# Patient Record
Sex: FEMALE | Race: WHITE | URBAN - METROPOLITAN AREA
[De-identification: names, ages, dates, MRNs, and addresses within clinical notes are randomized per-mention and may not be internally consistent; named-entity substitution may affect disease eponyms.]

---

## 2021-05-18 ENCOUNTER — OFFICE VISIT (OUTPATIENT)
Dept: URBAN - METROPOLITAN AREA CLINIC 44 | Facility: CLINIC | Age: 71
End: 2021-05-18
Payer: MEDICARE

## 2021-05-18 DIAGNOSIS — H25.813 COMBINED FORMS OF AGE-RELATED CATARACT, BILATERAL: ICD-10-CM

## 2021-05-18 DIAGNOSIS — H43.313 VITREOUS MEMBRANES AND STRANDS, BILATERAL: ICD-10-CM

## 2021-05-18 DIAGNOSIS — H40.1134 PRIMARY OPEN-ANGLE GLAUCOMA, BILATERAL, INDETERMINATE STAGE: ICD-10-CM

## 2021-05-18 DIAGNOSIS — H33.313 HORSESHOE TEAR OF RETINA WITHOUT DETACHMENT, BILATERAL: ICD-10-CM

## 2021-05-18 DIAGNOSIS — H18.523 EPITHELIAL (JUVENILE) CORNEAL DYSTROPHY, BILATERAL: Primary | ICD-10-CM

## 2021-05-18 PROCEDURE — 76514 ECHO EXAM OF EYE THICKNESS: CPT | Performed by: OPTOMETRIST

## 2021-05-18 PROCEDURE — 92134 CPTRZ OPH DX IMG PST SGM RTA: CPT | Performed by: OPTOMETRIST

## 2021-05-18 PROCEDURE — 92083 EXTENDED VISUAL FIELD XM: CPT | Performed by: OPTOMETRIST

## 2021-05-18 PROCEDURE — 99204 OFFICE O/P NEW MOD 45 MIN: CPT | Performed by: OPTOMETRIST

## 2021-05-18 PROCEDURE — 92133 CPTRZD OPH DX IMG PST SGM ON: CPT | Performed by: OPTOMETRIST

## 2021-05-18 ASSESSMENT — VISUAL ACUITY
OD: 20/30
OS: 20/25

## 2021-05-18 ASSESSMENT — INTRAOCULAR PRESSURE
OD: 10
OS: 10

## 2021-05-18 ASSESSMENT — KERATOMETRY
OS: 38.88
OD: 38.00

## 2021-05-18 NOTE — IMPRESSION/PLAN
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 Plan: History of retinal tears OU. Attached s/p laser OU. RTC ASAP if notice symptoms of RD. Monitor.

## 2021-05-18 NOTE — IMPRESSION/PLAN
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 Plan: Mild EBDM OU. AT's for comfort. Monitor.

## 2021-05-18 NOTE — IMPRESSION/PLAN
Impression: Peripheral opacity of cornea, bilateral: J15.576. Plan: Hx of LASIK OU in 1999. May limit VA after cat sx.

## 2021-05-18 NOTE — IMPRESSION/PLAN
Impression: Primary open-angle glaucoma, bilateral, indeterminate stage: H40.1134. Plan: Disc asymmetry OS>OD Pachymetry: thin OU
IOP: 10/10 Current meds: latanoprost QHS OU
OCT RNFL (05/18/21): wnl OU
HVF (05/18/21): essentially full OU Optos taken 05/18/21 IOP okay. Continue current meds. Discussed MIGS. Follow up with Dr. Rich Corbett for IOP management after cat sx.

## 2021-05-18 NOTE — IMPRESSION/PLAN
Impression: Combined forms of age-related cataract, bilateral: H25.813. Plan: Discussed cataracts with patient. Discussed treatment options. Surgical treatment is recommended. Surgical risks and benefits discussed. Patient elects surgical treatment. Recommend surgery OU, OD first. Patient is candidate/interested in: Standard lens, LenSx,  ORA. Aim OD: plano. Aim OS: plano. May need glasses for best vision after surgery. Consider MIGS. Glaucoma surgeon recommended. Dr. Luis Felipe Marquez is referring OD.

## 2021-06-10 ENCOUNTER — PRE-OPERATIVE VISIT (OUTPATIENT)
Dept: URBAN - METROPOLITAN AREA CLINIC 44 | Facility: CLINIC | Age: 71
End: 2021-06-10
Payer: MEDICARE

## 2021-06-10 DIAGNOSIS — H25.811 COMBINED FORMS OF AGE-RELATED CATARACT, RIGHT EYE: ICD-10-CM

## 2021-06-10 DIAGNOSIS — H25.812 COMBINED FORMS OF AGE-RELATED CATARACT, LEFT EYE: ICD-10-CM

## 2021-06-10 PROCEDURE — 92025 CPTRIZED CORNEAL TOPOGRAPHY: CPT | Performed by: OPHTHALMOLOGY

## 2021-06-10 PROCEDURE — 99204 OFFICE O/P NEW MOD 45 MIN: CPT | Performed by: OPHTHALMOLOGY

## 2021-06-10 PROCEDURE — 92133 CPTRZD OPH DX IMG PST SGM ON: CPT | Performed by: OPHTHALMOLOGY

## 2021-06-10 PROCEDURE — 92020 GONIOSCOPY: CPT | Performed by: OPHTHALMOLOGY

## 2021-06-10 PROCEDURE — 92083 EXTENDED VISUAL FIELD XM: CPT | Performed by: OPHTHALMOLOGY

## 2021-06-10 PROCEDURE — 76514 ECHO EXAM OF EYE THICKNESS: CPT | Performed by: OPHTHALMOLOGY

## 2021-06-10 RX ORDER — OFLOXACIN 3 MG/ML
0.3 % SOLUTION/ DROPS OPHTHALMIC
Qty: 5 | Refills: 1 | Status: INACTIVE
Start: 2021-06-10 | End: 2022-01-28

## 2021-06-10 RX ORDER — PREDNISOLONE ACETATE 10 MG/ML
1 % SUSPENSION/ DROPS OPHTHALMIC
Qty: 10 | Refills: 1 | Status: INACTIVE
Start: 2021-06-10 | End: 2022-01-28

## 2021-06-10 RX ORDER — KETOROLAC TROMETHAMINE 5 MG/ML
0.5 % SOLUTION OPHTHALMIC
Qty: 10 | Refills: 1 | Status: INACTIVE
Start: 2021-06-10 | End: 2022-01-28

## 2021-06-10 ASSESSMENT — INTRAOCULAR PRESSURE
OD: 17
OS: 16
OS: 16
OD: 17

## 2021-06-10 ASSESSMENT — PACHYMETRY
OD: 26.35
OS: 26.70

## 2021-06-10 ASSESSMENT — VISUAL ACUITY
OS: 20/25
OD: 20/30

## 2021-06-10 NOTE — IMPRESSION/PLAN
Impression: Combined forms of age-related cataract, left eye: H25.812. Plan: Okay to proceed with the second eye once the first eye is cleared.

## 2021-06-10 NOTE — IMPRESSION/PLAN
Impression: Open angle with borderline findings, low risk, bilateral: H40.013. Plan: Will need to obtain records from Dr. Rich Corbett to determine if patient has glaucoma/OHTN and if she would be benefit from [facility]. Stop Latanoprost and Return on Monday 6/14/21 for IOP Check.

## 2021-06-10 NOTE — IMPRESSION/PLAN
Impression: Regular astigmatism, left eye: H52.222. Plan: Discussed that LRI will be performed per patient request in attempts to lessen the amount astigmatism. Discussed residual astigmatism will be left and glasses will still be needed for BCVA.

## 2021-06-10 NOTE — IMPRESSION/PLAN
Impression: Other specified postprocedural states: Z98.890. Plan: *Previous LASIK Surgery. * Clear LASIK Flap OU. May limit outcome of surgery. Patient understands that there is a 50/50 chance of the need of additional surgery.

## 2021-06-11 ENCOUNTER — ADULT PHYSICAL (OUTPATIENT)
Dept: URBAN - METROPOLITAN AREA CLINIC 56 | Facility: CLINIC | Age: 71
End: 2021-06-11
Payer: MEDICARE

## 2021-06-11 DIAGNOSIS — Z01.818 ENCOUNTER FOR OTHER PREPROCEDURAL EXAMINATION: Primary | ICD-10-CM

## 2021-06-11 PROCEDURE — 99203 OFFICE O/P NEW LOW 30 MIN: CPT | Performed by: PHYSICIAN ASSISTANT

## 2021-06-14 ENCOUNTER — OFFICE VISIT (OUTPATIENT)
Dept: URBAN - METROPOLITAN AREA CLINIC 44 | Facility: CLINIC | Age: 71
End: 2021-06-14
Payer: MEDICARE

## 2021-06-14 DIAGNOSIS — H40.013 OPEN ANGLE WITH BORDERLINE FINDINGS, LOW RISK, BILATERAL: Primary | ICD-10-CM

## 2021-06-14 PROCEDURE — 99214 OFFICE O/P EST MOD 30 MIN: CPT | Performed by: OPHTHALMOLOGY

## 2021-06-14 ASSESSMENT — INTRAOCULAR PRESSURE
OS: 15
OD: 19

## 2021-06-14 NOTE — IMPRESSION/PLAN
Impression: Open angle with borderline findings, low risk, bilateral: H40.013. Plan: IOP is doing well since stopping Latanoprost. Explained to patient that she is a glaucoma suspect, but there is no glaucoma present at this time. No treatment being implemented at this time.

## 2021-06-17 ENCOUNTER — SURGERY (OUTPATIENT)
Dept: URBAN - METROPOLITAN AREA SURGERY 19 | Facility: SURGERY | Age: 71
End: 2021-06-17
Payer: MEDICARE

## 2021-06-17 PROCEDURE — 66984 XCAPSL CTRC RMVL W/O ECP: CPT | Performed by: OPHTHALMOLOGY

## 2021-06-18 ENCOUNTER — POST-OPERATIVE VISIT (OUTPATIENT)
Dept: URBAN - METROPOLITAN AREA CLINIC 51 | Facility: CLINIC | Age: 71
End: 2021-06-18
Payer: MEDICARE

## 2021-06-18 DIAGNOSIS — Z48.810 ENCOUNTER FOR SURGICAL AFTERCARE FOLLOWING SURGERY ON A SENSE ORGAN: Primary | ICD-10-CM

## 2021-06-18 PROCEDURE — 99024 POSTOP FOLLOW-UP VISIT: CPT | Performed by: OPHTHALMOLOGY

## 2021-06-18 ASSESSMENT — INTRAOCULAR PRESSURE
OS: 16
OD: 22

## 2021-06-18 NOTE — IMPRESSION/PLAN
Impression: S/P CE/Standard IOL OD - 1 Day. Encounter for surgical aftercare following surgery on a sense organ  Z48.810. Plan: POD1 Cataract Extraction by phacoemulsification with IOL placement  OD 6/17/21. 
-Doing well.
-Start Ofloxacin QID OD, Prednisolone QID OD and Ketorolac QID OD.
-Follow drop taper schedule. Discussed activity restrictions including no bending head below waist, rubbing the eye, straining or lifting over 10lbs. for 7 days, stay out of vic, dirty areas for 7 days and shield surgical eye for the next 7 nights. Patient advised to call with any RSVP (redness, sensitivity to light, vision change, pain worse than today) call 24/7.
-RTC: One week post op check as scheduled.

## 2021-06-25 ENCOUNTER — POST-OPERATIVE VISIT (OUTPATIENT)
Dept: URBAN - METROPOLITAN AREA CLINIC 51 | Facility: CLINIC | Age: 71
End: 2021-06-25
Payer: MEDICARE

## 2021-06-25 DIAGNOSIS — H52.222 REGULAR ASTIGMATISM, LEFT EYE: ICD-10-CM

## 2021-06-25 DIAGNOSIS — H04.123 DRY EYE SYNDROME OF BILATERAL LACRIMAL GLANDS: ICD-10-CM

## 2021-06-25 DIAGNOSIS — Z98.890 OTHER SPECIFIED POSTPROCEDURAL STATES: ICD-10-CM

## 2021-06-25 DIAGNOSIS — H17.823 PERIPHERAL OPACITY OF CORNEA, BILATERAL: ICD-10-CM

## 2021-06-25 PROCEDURE — 99024 POSTOP FOLLOW-UP VISIT: CPT | Performed by: OPHTHALMOLOGY

## 2021-06-25 ASSESSMENT — VISUAL ACUITY
OS: 20/25
OD: 20/30

## 2021-06-25 ASSESSMENT — INTRAOCULAR PRESSURE
OD: 21
OS: 18

## 2021-06-25 NOTE — IMPRESSION/PLAN
Impression: Peripheral opacity of cornea, bilateral: D80.822. Plan: Patient understands condition may limit possible outcome of surgery.

## 2021-06-25 NOTE — IMPRESSION/PLAN
Impression: S/P Cataract Extraction by phacoemulsification with IOL placement OD - 8 Days. Encounter for surgical aftercare following surgery on a sense organ  Z48.810. Plan: POW1 Phaco/IOL OD. Discussed diagnosis and treatment options in detail. Patient may proceed with second eye cataract surgery based on complaint today. Surgical risks and complications were reviewed today and all questions were answered. Patient reminded of need for glasses for best vision after cataract surgery. Recommend patient continue not rubbing surgical eye for 1 month, no swimming or eye makeup for a total of two weeks. Patient wishes to proceed with surgery of the second eye.
-Drop schedule: Stop Ofloxacin. Continue Ketorolac QID OD [id number] from surgery. Begin taper of Prednisolone OD to TID x1week then BID x1week then QD x1week then stop. 
Cont Latan QHS OU

## 2021-06-25 NOTE — IMPRESSION/PLAN
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 Plan: Patient understands condition may limit possible outcome of surgery.

## 2021-06-25 NOTE — IMPRESSION/PLAN
Impression: Dry eye syndrome of bilateral lacrimal glands: H04.123. Plan: Patient understands condition may limit possible outcome of surgery.

## 2021-06-25 NOTE — IMPRESSION/PLAN
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 Plan: Patient understands condition may limit possible outcome of surgery.

## 2021-07-01 ENCOUNTER — SURGERY (OUTPATIENT)
Dept: URBAN - METROPOLITAN AREA SURGERY 19 | Facility: SURGERY | Age: 71
End: 2021-07-01
Payer: MEDICARE

## 2021-07-01 PROCEDURE — 66984 XCAPSL CTRC RMVL W/O ECP: CPT | Performed by: OPHTHALMOLOGY

## 2021-07-02 ENCOUNTER — POST-OPERATIVE VISIT (OUTPATIENT)
Dept: URBAN - METROPOLITAN AREA CLINIC 56 | Facility: CLINIC | Age: 71
End: 2021-07-02

## 2021-07-02 PROCEDURE — 99024 POSTOP FOLLOW-UP VISIT: CPT | Performed by: OPHTHALMOLOGY

## 2021-07-02 ASSESSMENT — INTRAOCULAR PRESSURE
OS: 25
OD: 20

## 2021-07-02 NOTE — IMPRESSION/PLAN
Impression: S/P Cataract Extraction by phacoemulsification with IOL placement; Astigmatic Keratotomy OS - 1 Day. Presence of intraocular lens  Z96.1. Plan: -Start Ofloxacin QID OS, Prednisolone QID OS and Ketorolac QID OS, use artificial tears as needed to reduce irritation OS. -Follow drop taper schedule. Discussed activity restrictions including no bending head below waist, rubbing the eye, straining or lifting over 10lbs. for 7 days, stay out of vic, dirty areas for 7 days and shield surgical eye for the next 7 nights. Patient advised to call with any RSVP (redness, sensitivity to light, vision change, pain worse than today) call 24/7.
-RTC: One week post op check as scheduled.

## 2021-07-08 ENCOUNTER — POST-OPERATIVE VISIT (OUTPATIENT)
Dept: URBAN - METROPOLITAN AREA CLINIC 44 | Facility: CLINIC | Age: 71
End: 2021-07-08

## 2021-07-08 DIAGNOSIS — Z96.1 PRESENCE OF INTRAOCULAR LENS: Primary | ICD-10-CM

## 2021-07-08 PROCEDURE — 99024 POSTOP FOLLOW-UP VISIT: CPT | Performed by: OPHTHALMOLOGY

## 2021-07-08 ASSESSMENT — INTRAOCULAR PRESSURE
OS: 15
OD: 21

## 2021-07-08 NOTE — IMPRESSION/PLAN
Impression: S/P Cataract Extraction by phacoemulsification with IOL placement; Astigmatic Keratotomy OS - 7 Days. Presence of intraocular lens  Z96.1. Plan: Advised patient that eye is healing well. Discontinue activity restrictions and shield use. Patient to continue not rubbing surgical eye for 1 month. Drop instructions: Stop Ofloxacin. Continue Ketorolac QID OOS [id number] from surgery. Begin taper of Prednisolone OS to TID x1week then BID x1week then QD x1week then stop. RTC 3 weeks for  PO Refraction and dilate OU with Optom.  Continue to FU Optom as a glaucoma suspect

## 2021-07-28 ENCOUNTER — POST-OPERATIVE VISIT (OUTPATIENT)
Dept: URBAN - METROPOLITAN AREA CLINIC 56 | Facility: CLINIC | Age: 71
End: 2021-07-28
Payer: MEDICARE

## 2021-07-28 PROCEDURE — 99024 POSTOP FOLLOW-UP VISIT: CPT | Performed by: OPTOMETRIST

## 2021-07-28 ASSESSMENT — VISUAL ACUITY
OS: 20/20
OD: 20/20

## 2021-07-28 ASSESSMENT — KERATOMETRY
OS: 38.55
OD: 37.98

## 2021-07-28 ASSESSMENT — INTRAOCULAR PRESSURE
OD: 13
OS: 13

## 2022-01-28 ENCOUNTER — OFFICE VISIT (OUTPATIENT)
Dept: URBAN - METROPOLITAN AREA CLINIC 56 | Facility: CLINIC | Age: 72
End: 2022-01-28
Payer: MEDICARE

## 2022-01-28 PROCEDURE — 99213 OFFICE O/P EST LOW 20 MIN: CPT | Performed by: OPTOMETRIST

## 2022-01-28 PROCEDURE — 92083 EXTENDED VISUAL FIELD XM: CPT | Performed by: OPTOMETRIST

## 2022-01-28 ASSESSMENT — INTRAOCULAR PRESSURE
OD: 12
OS: 14

## 2022-01-28 NOTE — IMPRESSION/PLAN
Impression: Open angle with borderline findings, low risk, bilateral: H40.013. Plan: IOP and testing are stable. Will not start treatment. 
RTC 6 months CE, possible OCT

## 2022-08-01 ENCOUNTER — OFFICE VISIT (OUTPATIENT)
Dept: URBAN - METROPOLITAN AREA CLINIC 56 | Facility: CLINIC | Age: 72
End: 2022-08-01
Payer: MEDICARE

## 2022-08-01 DIAGNOSIS — H40.013 OPEN ANGLE WITH BORDERLINE FINDINGS, LOW RISK, BILATERAL: Primary | ICD-10-CM

## 2022-08-01 DIAGNOSIS — H04.123 DRY EYE SYNDROME OF BILATERAL LACRIMAL GLANDS: ICD-10-CM

## 2022-08-01 DIAGNOSIS — H33.313 HORSESHOE TEAR OF RETINA WITHOUT DETACHMENT, BILATERAL: ICD-10-CM

## 2022-08-01 DIAGNOSIS — Z96.1 PRESENCE OF INTRAOCULAR LENS: ICD-10-CM

## 2022-08-01 DIAGNOSIS — H43.813 VITREOUS DEGENERATION, BILATERAL: ICD-10-CM

## 2022-08-01 DIAGNOSIS — H26.493 OTHER SECONDARY CATARACT, BILATERAL: ICD-10-CM

## 2022-08-01 PROCEDURE — 92133 CPTRZD OPH DX IMG PST SGM ON: CPT | Performed by: OPTOMETRIST

## 2022-08-01 PROCEDURE — 92014 COMPRE OPH EXAM EST PT 1/>: CPT | Performed by: OPTOMETRIST

## 2022-08-01 ASSESSMENT — INTRAOCULAR PRESSURE
OD: 14
OS: 14

## 2022-08-01 ASSESSMENT — VISUAL ACUITY
OD: 20/20
OS: 20/30

## 2022-08-01 ASSESSMENT — KERATOMETRY
OS: 39.59
OD: 38.66

## 2022-08-01 NOTE — IMPRESSION/PLAN
Impression: Open angle with borderline findings, low risk, bilateral: H40.013.  Plan: RTC in 6 months for VF 24-2 and IOP check

## 2023-02-06 ENCOUNTER — OFFICE VISIT (OUTPATIENT)
Dept: URBAN - METROPOLITAN AREA CLINIC 56 | Facility: LOCATION | Age: 73
End: 2023-02-06
Payer: MEDICARE

## 2023-02-06 DIAGNOSIS — H40.013 OPEN ANGLE WITH BORDERLINE FINDINGS, LOW RISK, BILATERAL: Primary | ICD-10-CM

## 2023-02-06 PROCEDURE — 99213 OFFICE O/P EST LOW 20 MIN: CPT | Performed by: OPTOMETRIST

## 2023-02-06 PROCEDURE — 92083 EXTENDED VISUAL FIELD XM: CPT | Performed by: OPTOMETRIST

## 2023-02-06 ASSESSMENT — INTRAOCULAR PRESSURE
OS: 10
OD: 10

## 2023-02-06 NOTE — IMPRESSION/PLAN
Impression: Open angle with borderline findings, low risk, bilateral: H40.013. Plan: IOP stable. VF 24-2 is full in each eye. Will not start treatment. 
RTC 6 months CE and OCT RNFL/GCA

## 2023-08-07 ENCOUNTER — OFFICE VISIT (OUTPATIENT)
Dept: URBAN - METROPOLITAN AREA CLINIC 56 | Facility: LOCATION | Age: 73
End: 2023-08-07
Payer: MEDICARE

## 2023-08-07 DIAGNOSIS — H04.123 DRY EYE SYNDROME OF BILATERAL LACRIMAL GLANDS: ICD-10-CM

## 2023-08-07 DIAGNOSIS — H40.023 OPEN ANGLE WITH BORDERLINE FINDINGS, HIGH RISK, BILATERAL: Primary | ICD-10-CM

## 2023-08-07 DIAGNOSIS — H26.493 OTHER SECONDARY CATARACT, BILATERAL: ICD-10-CM

## 2023-08-07 DIAGNOSIS — H40.013 OPEN ANGLE WITH BORDERLINE FINDINGS, LOW RISK, BILATERAL: ICD-10-CM

## 2023-08-07 DIAGNOSIS — H33.313 HORSESHOE TEAR OF RETINA WITHOUT DETACHMENT, BILATERAL: ICD-10-CM

## 2023-08-07 DIAGNOSIS — H43.813 VITREOUS DEGENERATION, BILATERAL: ICD-10-CM

## 2023-08-07 DIAGNOSIS — Z96.1 PRESENCE OF INTRAOCULAR LENS: ICD-10-CM

## 2023-08-07 PROCEDURE — 92133 CPTRZD OPH DX IMG PST SGM ON: CPT | Performed by: OPTOMETRIST

## 2023-08-07 PROCEDURE — 99214 OFFICE O/P EST MOD 30 MIN: CPT | Performed by: OPTOMETRIST

## 2023-08-07 ASSESSMENT — INTRAOCULAR PRESSURE
OD: 14
OS: 14

## 2023-08-07 ASSESSMENT — KERATOMETRY
OD: 38.59
OS: 39.62

## 2023-08-07 ASSESSMENT — VISUAL ACUITY
OS: 20/30
OD: 20/30

## 2023-10-27 ENCOUNTER — SURGERY (OUTPATIENT)
Dept: URBAN - METROPOLITAN AREA SURGERY 19 | Facility: SURGERY | Age: 73
End: 2023-10-27
Payer: MEDICARE

## 2023-10-27 PROCEDURE — 66821 AFTER CATARACT LASER SURGERY: CPT | Performed by: OPHTHALMOLOGY

## 2024-03-18 ENCOUNTER — OFFICE VISIT (OUTPATIENT)
Dept: URBAN - METROPOLITAN AREA CLINIC 56 | Facility: LOCATION | Age: 74
End: 2024-03-18
Payer: MEDICARE

## 2024-03-18 DIAGNOSIS — H40.023 OPEN ANGLE WITH BORDERLINE FINDINGS, HIGH RISK, BILATERAL: Primary | ICD-10-CM

## 2024-03-18 PROCEDURE — 92083 EXTENDED VISUAL FIELD XM: CPT | Performed by: OPTOMETRIST

## 2024-03-18 PROCEDURE — 99213 OFFICE O/P EST LOW 20 MIN: CPT | Performed by: OPTOMETRIST

## 2024-03-18 ASSESSMENT — INTRAOCULAR PRESSURE
OD: 12
OS: 13

## 2024-10-08 ENCOUNTER — OFFICE VISIT (OUTPATIENT)
Dept: URBAN - METROPOLITAN AREA CLINIC 56 | Facility: LOCATION | Age: 74
End: 2024-10-08
Payer: MEDICARE

## 2024-10-08 DIAGNOSIS — Z96.1 PRESENCE OF INTRAOCULAR LENS: ICD-10-CM

## 2024-10-08 DIAGNOSIS — H04.123 TEAR FILM INSUFFICIENCY OF BILATERAL LACRIMAL GLANDS: ICD-10-CM

## 2024-10-08 DIAGNOSIS — H40.023 OPEN ANGLE WITH BORDERLINE FINDINGS, HIGH RISK, BILATERAL: Primary | ICD-10-CM

## 2024-10-08 DIAGNOSIS — H43.813 VITREOUS DEGENERATION, BILATERAL: ICD-10-CM

## 2024-10-08 DIAGNOSIS — H33.313 HORSESHOE TEAR OF RETINA WITHOUT DETACHMENT, BILATERAL: ICD-10-CM

## 2024-10-08 PROCEDURE — 92133 CPTRZD OPH DX IMG PST SGM ON: CPT | Performed by: OPTOMETRIST

## 2024-10-08 PROCEDURE — 92014 COMPRE OPH EXAM EST PT 1/>: CPT | Performed by: OPTOMETRIST

## 2024-10-08 ASSESSMENT — VISUAL ACUITY
OD: 20/20
OS: 20/25

## 2024-10-08 ASSESSMENT — INTRAOCULAR PRESSURE
OD: 15
OS: 14

## 2024-10-08 ASSESSMENT — KERATOMETRY
OS: 39.30
OD: 38.42

## 2025-04-08 ENCOUNTER — OFFICE VISIT (OUTPATIENT)
Dept: URBAN - METROPOLITAN AREA CLINIC 56 | Facility: LOCATION | Age: 75
End: 2025-04-08
Payer: MEDICARE

## 2025-04-08 DIAGNOSIS — H40.023 OPEN ANGLE WITH BORDERLINE FINDINGS, HIGH RISK, BILATERAL: Primary | ICD-10-CM

## 2025-04-08 PROCEDURE — 99213 OFFICE O/P EST LOW 20 MIN: CPT | Performed by: OPTOMETRIST

## 2025-04-08 PROCEDURE — 92083 EXTENDED VISUAL FIELD XM: CPT | Performed by: OPTOMETRIST

## 2025-04-08 ASSESSMENT — INTRAOCULAR PRESSURE
OD: 12
OS: 12